# Patient Record
Sex: FEMALE | Race: OTHER | Employment: PART TIME | URBAN - METROPOLITAN AREA
[De-identification: names, ages, dates, MRNs, and addresses within clinical notes are randomized per-mention and may not be internally consistent; named-entity substitution may affect disease eponyms.]

---

## 2017-10-19 ENCOUNTER — HOSPITAL ENCOUNTER (OUTPATIENT)
Dept: PAIN MANAGEMENT | Age: 37
Discharge: HOME | End: 2017-10-19
Payer: MEDICAID

## 2017-10-19 VITALS
TEMPERATURE: 98.6 F | WEIGHT: 137 LBS | OXYGEN SATURATION: 98 % | BODY MASS INDEX: 22.82 KG/M2 | RESPIRATION RATE: 20 BRPM | HEART RATE: 76 BPM | HEIGHT: 65 IN

## 2017-10-19 DIAGNOSIS — M51.36 DDD (DEGENERATIVE DISC DISEASE), LUMBAR: ICD-10-CM

## 2017-10-19 DIAGNOSIS — M54.50 MIDLINE LOW BACK PAIN WITHOUT SCIATICA, UNSPECIFIED CHRONICITY: Primary | ICD-10-CM

## 2017-10-19 PROCEDURE — 80307 DRUG TEST PRSMV CHEM ANLYZR: CPT

## 2017-10-19 PROCEDURE — 99244 OFF/OP CNSLTJ NEW/EST MOD 40: CPT | Performed by: PAIN MEDICINE

## 2017-10-19 PROCEDURE — 99203 OFFICE O/P NEW LOW 30 MIN: CPT

## 2017-10-19 RX ORDER — AMOXICILLIN AND CLAVULANATE POTASSIUM 875; 125 MG/1; MG/1
1 TABLET, FILM COATED ORAL
COMMUNITY
Start: 2017-09-10

## 2017-10-19 RX ORDER — INFLUENZA VIRUS VACCINE 15; 15; 15; 15 UG/.5ML; UG/.5ML; UG/.5ML; UG/.5ML
SUSPENSION INTRAMUSCULAR
Refills: 0 | COMMUNITY
Start: 2017-09-28

## 2017-10-19 RX ORDER — ONDANSETRON 4 MG/1
4 TABLET, FILM COATED ORAL
COMMUNITY
Start: 2017-10-18 | End: 2017-10-19 | Stop reason: ALTCHOICE

## 2017-10-19 RX ORDER — SUMATRIPTAN 100 MG/1
TABLET, FILM COATED ORAL
Refills: 0 | COMMUNITY
Start: 2017-09-18

## 2017-10-19 RX ORDER — PREDNISONE 10 MG/1
TABLET ORAL
COMMUNITY
Start: 2017-10-18

## 2017-10-19 RX ORDER — AMOXICILLIN AND CLAVULANATE POTASSIUM 875; 125 MG/1; MG/1
TABLET, FILM COATED ORAL
Refills: 0 | COMMUNITY
Start: 2017-09-19

## 2017-10-19 RX ORDER — TOPIRAMATE 50 MG/1
TABLET, FILM COATED ORAL
Refills: 0 | COMMUNITY
Start: 2017-09-18 | End: 2017-10-19 | Stop reason: ALTCHOICE

## 2017-10-19 RX ORDER — DICLOFENAC SODIUM 75 MG/1
TABLET, DELAYED RELEASE ORAL
Refills: 0 | COMMUNITY
Start: 2017-09-18 | End: 2017-10-19

## 2017-10-19 RX ORDER — AMITRIPTYLINE HYDROCHLORIDE 10 MG/1
TABLET, FILM COATED ORAL
COMMUNITY
Start: 2017-10-18 | End: 2017-10-19 | Stop reason: ALTCHOICE

## 2017-10-19 RX ORDER — CYCLOBENZAPRINE HCL 10 MG
TABLET ORAL
Refills: 0 | COMMUNITY
Start: 2017-09-18 | End: 2017-10-19 | Stop reason: ALTCHOICE

## 2017-10-19 ASSESSMENT — PAIN DESCRIPTION - ONSET: ONSET: ON-GOING

## 2017-10-19 ASSESSMENT — ACTIVITIES OF DAILY LIVING (ADL): EFFECT OF PAIN ON DAILY ACTIVITIES: UNABLE TO WALK SHORT DISTANCES WITHOUT PAIN

## 2017-10-19 ASSESSMENT — ENCOUNTER SYMPTOMS
PHOTOPHOBIA: 0
ABDOMINAL PAIN: 0
COUGH: 1
BLURRED VISION: 0
BACK PAIN: 1
CONSTIPATION: 0
HEARTBURN: 0
NAUSEA: 1
SHORTNESS OF BREATH: 0
VOMITING: 0

## 2017-10-19 ASSESSMENT — PAIN DESCRIPTION - ORIENTATION: ORIENTATION: RIGHT;LEFT;LOWER;UPPER

## 2017-10-19 ASSESSMENT — PAIN DESCRIPTION - FREQUENCY: FREQUENCY: CONTINUOUS

## 2017-10-19 ASSESSMENT — PAIN DESCRIPTION - PAIN TYPE: TYPE: CHRONIC PAIN

## 2017-10-19 ASSESSMENT — PAIN DESCRIPTION - LOCATION: LOCATION: BACK;HEAD

## 2017-10-19 ASSESSMENT — PAIN DESCRIPTION - PROGRESSION: CLINICAL_PROGRESSION: NOT CHANGED

## 2017-10-19 ASSESSMENT — PAIN SCALES - GENERAL: PAINLEVEL_OUTOF10: 7

## 2017-10-19 NOTE — PROGRESS NOTES
Head  Pain Orientation: Right, Left, Lower, Upper  Pain Radiating Towards: pain down left leg  Pain Descriptors: Aching, Burning, Constant, Sharp, Headache, Spasm  Pain Frequency: Continuous  Pain Onset: On-going  Clinical Progression: Not changed  Effect of Pain on Daily Activities: unable to walk short distances without pain  Patient's Stated Pain Goal: 2 (Pain to a 2 and increase activity)  Pain Intervention(s): Medication (see eMar), Rest                    ADVERSE MEDICATION EFFECTS:   Constipation: no  Bowel Regimen: No  Diet: common adult  Appetite:  ok  Sedation:  no  Urinary Retention: no    FOCUSED PAIN SCALE:  Highest : 10  Lowest :7  Average: Range-varies with activity  When and What  was your last procedure: steroid injections in muscle only     Was your procedure effective: Only helps for a few hours    ACTIVITY/SOCIAL/EMOTIONAL:  Sleep Pattern: 6 hours per night. difficulty falling back asleep if awakened  Energy Level:  Normal  Currently attending Physical Therapy:  No  Home Exercises: housework  Mobility: walking causes pain  Currently seeing a Psychiatrist or Psychologist:  No  Emotional Issues: anxiety/ nervousness   Mood: depressed/ denies suicidal thought    ABERRANT BEHAVIORS SINCE LAST VISIT:  Have you ever been treated in another Pain Clinic no  Refills for prescriptions appropriate: na  Lost rx/pills: na  Taking more medication than prescribed:  not applicable  Are you receiving PAIN medications from  other doctors: not applicable  Last Urine/Serum Drug Screen : will collect today  Was Serum/UDS as anticipated?  na  Brought pill bottles in :not applicable   Was Pill count appropriate? :na  Are currently pregnant? No, tubaligation  Recent ER visits: No             Past Medical History  History reviewed. No pertinent past medical history.     Surgical History  Past Surgical History:   Procedure Laterality Date    CHOLECYSTECTOMY      FRACTURE SURGERY      NM  DELIVERY ONLY , Low Cervical    SALIVARY GLAND SURGERY         Medications  Current Outpatient Prescriptions   Medication Sig Dispense Refill    amoxicillin-clavulanate (AUGMENTIN) 875-125 MG per tablet Take 1 tablet by mouth      predniSONE (DELTASONE) 10 MG tablet Take 4 tablets daily for 2 days, then 3 tablets daily for 2 days, then 2 tablets daily for 2 days, then 1 tablet daily for 2 days      amoxicillin-clavulanate (AUGMENTIN) 875-125 MG per tablet take 1 tablet by mouth twice a day **HAVE A YOGURT AND OR PROBIOTIC DAILY WHILE ON THIS MEDICATION  0    FLUARIX QUADRIVALENT 0.5 ML injection inject 0.5 milliliter intramuscularly  0    SUMAtriptan (IMITREX) 100 MG tablet take 1 tablet by mouth AT FIRST SIGN OF HEADACHE, MAY REPEAT AFTER ONE HOUR  0     No current facility-administered medications for this encounter. Allergies  Other; Morphine; and Mustard seed    Family History  family history includes COPD in her father; Diabetes in her mother; Heart Attack in her mother. Social History  Social History     Social History    Marital status: Single     Spouse name: N/A    Number of children: N/A    Years of education: N/A     Social History Main Topics    Smoking status: Current Every Day Smoker     Packs/day: 0.50     Years: 20.00     Types: Cigarettes    Smokeless tobacco: Never Used    Alcohol use No    Drug use:      Types: Marijuana      Comment: states last used 2 months ago    Sexual activity: Not Asked     Other Topics Concern    None     Social History Narrative    None      reports that she uses drugs, including Marijuana. REVIEW OF SYSTEMS:  Review of Systems   Constitutional: Positive for malaise/fatigue. Negative for chills, fever and weight loss. HENT: Negative for ear discharge and hearing loss. Eyes: Negative for blurred vision and photophobia. Respiratory: Positive for cough. Negative for shortness of breath.     Cardiovascular: Negative for chest pain and Imaging:  Radiology Images and Reports reviewed where indicated and necessary     EXAM:  XR PELVIS STANDARD       HISTORY:  fall/injury macro pelvis       TECHNIQUE:  AP view of the pelvis        PRIORS:  None.       FINDINGS:     The osseous structures are maintained. The hip joint spaces are intact.  No fractures are seen. The sacrum and sacroiliac joints appear normal. The visualized osseous structures in the lumbar spine are intact.        Impression:     Normal study of the pelvis.  No evidence of fracture.            Electronically Signed By: Caitlyn Horton   on  01/26/2016      Impression:     *  Vague and diffuse subcutaneous edema correlated. Concern anterior to the left mandible. There some advanced dental caries in this region which could reflect underlying inflammatory response. No discrete mass nor abscess associated with this marker. *  Low dense solid mass adjacent to the right submandibular gland. This appears to be separate from the submandibular gland and may require further investigation Ultimately tissue diagnosis may be necessary. *  Some mild chronic inflammation of the left maxillary sinus with ultrasound. Finalized by Serina Alvarez MD on 11/3/2016     X-ray spine lumbar 2 or 3 views7/28/2013  [facility]  Result Narrative   Procedure: Lumbo-sacral spine radiographs performed  Number of views: 2  History: Injury, pain  Comparison: None  Findings: There is no fracture, malalignment, or destructive lesion. Impression:  1. No acute findings.                   Clips seen right upper quadrant       SI joints look relatively symmetric, very mild asymmetric narrowing        on the right side without erosions seen       There is no compression fracture seen       There is no pathologic subluxation seen       There is no destructive bone lesion seen       There is no advanced disc disease.  Some minor bony endplate        +-------------------+                     irregularity or greatly reduce their risk for disease and premature death. Lowered risk for lung cancer and many other types of cancer. Reduced risk for coronary heart disease, stroke, and peripheral vascular disease. Reduced coronary heart disease risk within 1 to 2 years of quitting. Reduced respiratory symptoms, such as coughing, wheezing, and shortness of breath. The rate of decline in lung function is slower among people who quit smoking than among those who continue to smoke. Reduced risk of developing chronic obstructive pulmonary disease (COPD), one of the leading causes of death in the [place]. Reduced risk for infertility in women of reproductive age. Women who stop smoking during pregnancy also reduce their risk of having a low birth weight baby. Reduced risk of bone and joint damage. Methods to Quit Smoking  The majority of cigarette smokers quit without using evidence-based   Counseling and medication are both effective for treating tobacco dependence, and using them together is more effective than using either one alone. Patient is advised to follow up with his physician for further management. At this time of this examination patient's physical findings are minimal. We will refer the patient to physical therapy. Patient is advised to exercise at home on a regular basis. Depending on the response to the physical therapy we will plan further. At this time there is not much further for me to offer the patient and patient is advised to follow-up with her physician and we will see her as needed.    Orders Placed This Encounter   Procedures    DRUG SCREEN, PAIN     Standing Status:   Standing     Number of Occurrences:   1    PT eval and treat     TBD by Pain Clinic MD     Standing Status:   Future     Standing Expiration Date:   10/19/2018    PT aquatic therapy     TBD by Pain Clinic MD     Standing Status:   Future     Standing Expiration Date:   10/19/2018       Decision Making Process : Patient's health history and referral records thoroughly reviewed before focused physical examination and discussion with patient. Over 50% of today's visit is spent on examining the patient and counseling. Level of complexity of date to be reviewed is Moderate. The chart date reviewed include the following: Imaging Reports. Summary of Care. Time spent reviewing with patient the below reports:   Medication safety, Treatment options. Level of diagnosis and management options of this case is multiple: involving the following management options: Interventions as needed, medication management as appropriate, future visits, activity modification, heat/ice as needed, Urine drug screen as required. [x]The patient's questions were answered to the best of my abilities. Return in  As needed weeks  with Ai Foster M.D.  for further plan of treatment. This note was created using voice recognition software. There may be inaccuracies of transcription  that are inadvertently overlooked prior to the signature. There is any questions about the transcription please contact me.     Electronically signed by Audra Mustafa MD on 10/20/2017 at 3:29 AM

## 2017-10-24 LAB
6-ACETYLMORPHINE, UR: NOT DETECTED
7-AMINOCLONAZEPAM, URINE: NOT DETECTED
ALPHA-OH-ALPRAZ, URINE: NOT DETECTED
ALPRAZOLAM, URINE: NOT DETECTED
AMPHETAMINES, URINE: NOT DETECTED
BARBITURATES, URINE: NOT DETECTED
BENZOYLECGONINE, UR: NOT DETECTED
BUPRENORPHINE URINE: NOT DETECTED
CARISOPRODOL, UR: NOT DETECTED
CLONAZEPAM, URINE: NOT DETECTED
CODEINE, URINE: NOT DETECTED
CREATININE URINE: 67.2 MG/DL (ref 20–400)
DIAZEPAM, URINE: NOT DETECTED
DRUGS EXPECTED, UR: NORMAL
EER HI RES INTERP UR: NORMAL
ETHYL GLUCURONIDE UR: NOT DETECTED
FENTANYL URINE: NOT DETECTED
HYDROCODONE, URINE: NOT DETECTED
HYDROMORPHONE, URINE: NOT DETECTED
LORAZEPAM, URINE: NOT DETECTED
MARIJUANA METAB, UR: PRESENT
MDA, UR: NOT DETECTED
MDEA, EVE, UR: NOT DETECTED
MDMA URINE: NOT DETECTED
MEPERIDINE METAB, UR: NOT DETECTED
METHADONE, URINE: NOT DETECTED
METHAMPHETAMINE, URINE: NOT DETECTED
METHYLPHENIDATE: NOT DETECTED
MIDAZOLAM, URINE: NOT DETECTED
MORPHINE URINE: NOT DETECTED
NORBUPRENORPHINE, URINE: NOT DETECTED
NORDIAZEPAM, URINE: NOT DETECTED
NORFENTANYL, URINE: NOT DETECTED
NORHYDROCODONE, URINE: NOT DETECTED
NOROXYCODONE, URINE: NOT DETECTED
NOROXYMORPHONE, URINE: NOT DETECTED
OXAZEPAM, URINE: NOT DETECTED
OXYCODONE URINE: NOT DETECTED
OXYMORPHONE, URINE: NOT DETECTED
PAIN MANAGEMENT DRUG PANEL INTERP, URINE: NORMAL
PAIN MGT DRUG PANEL, HI RES, UR: NORMAL
PCP,URINE: NOT DETECTED
PHENTERMINE, UR: NOT DETECTED
PROPOXYPHENE, URINE: NOT DETECTED
TAPENTADOL, URINE: NOT DETECTED
TAPENTADOL-O-SULFATE, URINE: NOT DETECTED
TEMAZEPAM, URINE: NOT DETECTED
TRAMADOL, URINE: NOT DETECTED
ZOLPIDEM, URINE: NOT DETECTED